# Patient Record
Sex: FEMALE | Race: WHITE | URBAN - METROPOLITAN AREA
[De-identification: names, ages, dates, MRNs, and addresses within clinical notes are randomized per-mention and may not be internally consistent; named-entity substitution may affect disease eponyms.]

---

## 2020-06-01 ENCOUNTER — TELEPHONE ENCOUNTER (OUTPATIENT)
Dept: URBAN - METROPOLITAN AREA CLINIC 80 | Facility: CLINIC | Age: 70
End: 2020-06-01

## 2020-06-03 ENCOUNTER — TELEPHONE ENCOUNTER (OUTPATIENT)
Dept: URBAN - METROPOLITAN AREA CLINIC 80 | Facility: CLINIC | Age: 70
End: 2020-06-03

## 2020-06-15 ENCOUNTER — OFFICE VISIT (OUTPATIENT)
Dept: URBAN - METROPOLITAN AREA CLINIC 80 | Facility: CLINIC | Age: 70
End: 2020-06-15

## 2020-06-19 ENCOUNTER — TELEPHONE ENCOUNTER (OUTPATIENT)
Dept: URBAN - METROPOLITAN AREA CLINIC 92 | Facility: CLINIC | Age: 70
End: 2020-06-19

## 2020-06-19 ENCOUNTER — OFFICE VISIT (OUTPATIENT)
Dept: URBAN - METROPOLITAN AREA TELEHEALTH 2 | Facility: TELEHEALTH | Age: 70
End: 2020-06-19
Payer: MEDICARE

## 2020-06-19 DIAGNOSIS — R10.31 RLQ ABDOMINAL PAIN: ICD-10-CM

## 2020-06-19 DIAGNOSIS — K59.01 CONSTIPATION: ICD-10-CM

## 2020-06-19 DIAGNOSIS — Z80.0 FAMILY HISTORY OF COLON CANCER: ICD-10-CM

## 2020-06-19 DIAGNOSIS — Z86.010 HISTORY OF COLON POLYPS: ICD-10-CM

## 2020-06-19 DIAGNOSIS — K57.90 DIVERTICULAR DISEASE: ICD-10-CM

## 2020-06-19 DIAGNOSIS — Z90.49 S/P CHOLECYSTECTOMY: ICD-10-CM

## 2020-06-19 DIAGNOSIS — K57.30 ACQUIRED DIVERTICULOSIS OF COLON: ICD-10-CM

## 2020-06-19 PROCEDURE — 3017F COLORECTAL CA SCREEN DOC REV: CPT | Performed by: INTERNAL MEDICINE

## 2020-06-19 PROCEDURE — 99214 OFFICE O/P EST MOD 30 MIN: CPT | Performed by: INTERNAL MEDICINE

## 2020-06-19 PROCEDURE — G8417 CALC BMI ABV UP PARAM F/U: HCPCS | Performed by: INTERNAL MEDICINE

## 2020-06-19 PROCEDURE — G9903 PT SCRN TBCO ID AS NON USER: HCPCS | Performed by: INTERNAL MEDICINE

## 2020-06-19 PROCEDURE — G8427 DOCREV CUR MEDS BY ELIG CLIN: HCPCS | Performed by: INTERNAL MEDICINE

## 2020-06-19 PROCEDURE — 1036F TOBACCO NON-USER: CPT | Performed by: INTERNAL MEDICINE

## 2020-06-19 RX ORDER — LUBIPROSTONE 8 UG/1
TAKE 1 CAPSULE (8 MCG) BY ORAL ROUTE 2 TIMES PER DAY WITH FOOD AND WATER FOR 90 DAYS CAPSULE, GELATIN COATED ORAL 2
Qty: 180 | Refills: 3 | Status: ACTIVE | COMMUNITY
Start: 2020-05-26 | End: 2021-05-21

## 2020-06-19 RX ORDER — GUARANA 1000 MG
TABLET ORAL
Qty: 0 | Refills: 0 | Status: ACTIVE | COMMUNITY
Start: 1900-01-01 | End: 1900-01-01

## 2020-06-19 RX ORDER — EPINEPHRINE 0.3 MG/.3ML
INJECT 0.3 MILLILITER (0.3 MG) BY INTRAMUSCULAR ROUTE ONCE AS NEEDED FOR ANAPHYLAXIS FOR 30 DAYS INJECTION INTRAMUSCULAR
Qty: 1 | Refills: 1 | Status: ACTIVE | COMMUNITY
Start: 2020-05-26 | End: 2020-07-25

## 2020-06-19 RX ORDER — DICYCLOMINE HYDROCHLORIDE 10 MG/1
TAKE 1 CAPSULE BY ORAL ROUTE 3 TIMES A DAY AS NEEDED FOR 30 DAYS CAPSULE ORAL
Qty: 60 | Refills: 2 | Status: ACTIVE | COMMUNITY
Start: 2020-05-07 | End: 2020-08-05

## 2020-06-19 RX ORDER — TIZANIDINE HYDROCHLORIDE 4 MG/1
CAPSULE ORAL
Qty: 0 | Refills: 0 | Status: ACTIVE | COMMUNITY
Start: 1900-01-01 | End: 1900-01-01

## 2020-06-19 RX ORDER — OXYCODONE HYDROCHLORIDE 10 MG/1
TABLET ORAL
Qty: 0 | Refills: 0 | Status: ACTIVE | COMMUNITY
Start: 1900-01-01 | End: 1900-01-01

## 2020-06-19 NOTE — PHYSICAL EXAM NEUROLOGIC:
oriented to person, place and time , normal sensation , short and long term memory intact , oriented to person, place and time

## 2020-06-19 NOTE — PHYSICAL EXAM HENT:
Head,  normocephalic,  atraumatic,  Face,  Face within normal limits,  Ears,  External ears within normal limits,  Nose/Nasopharynx,  External nose  normal appearance,  nares patent,  no nasal discharge,

## 2020-06-19 NOTE — HPI-OTHER HISTORIES
Patient seen today via telehealth by agreement and consent of patient in light of current COVID-19 pandemic. I used video conferencing during the visit. The patient encounter is appropriate and reasonable under the circumstances given the patient's particular presentation at this time. The patient has been advised of the followin) the potential risks and limitations of this mode of treatment (including but not limited to the absence of in-person examination); 2) the right to refuse telehealth services at any point without affecting the right to future care; 3) the right to receive in-person services, included immediately after this consultation if an urgent need arises; 4) information, including identifiable images or information from this telehealth consult, will only be shared in accordance with HIPAA regulations. Any and all of the patient's and/or patient's family member's questions on this issue have been answered. The patient has verbally consented to be treated via telehealth services. The patient has also been advised to contact this office for worsening conditions or problems, and seek emergency medical treatment and/or call 911 if the patient deems either necessary.      pt with rlq pain that started in 2019 pain is constant and radiating to her pelvis s/p hysterectomy in the past wakes her up in the morning improves with heat pads pain unrelated to eating or drinking exacerbated with standing and walking she is cautious about her diet due to concern for diverticulitis  assoc constipation with 1 bm every 1-2 weeks with type 1 stool on the bristol scale she was given amitiza and had diarrhea with bid dosing.  she is doing better with daily dosing but denies any change in her pain with improved bm feels like "charcoal on the right side when she walks' no blood in the stool in the interim, she had a mrcp with dilateed biliary duct but no stones.  cta done with right middle lobe atalectasis vs chronic mucus blugging but no signs of abdominal vascular impingement previously given gabapentin and had relief x 1 day sees pain doctor and on keppra, flexeril, and oxycodone without relief  ct in 2019 with cholecystitis. mild cbd prominence and diverticular disease cta in 2020 mrcp in 2020  prior work up with dr. ramirez with egd in 2020 with normal colon and small bowel biopsies prior colon for hx of polyps in 2019 with no polyps.  5 yr f/u given.

## 2020-06-19 NOTE — PHYSICAL EXAM CONSTITUTIONAL:
well developed, well nourished , in no acute distress , ambulating without difficulty , normal communication ability , pleasant, well nourished, well developed, in no acute distress , normal communication ability

## 2020-06-19 NOTE — PHYSICAL EXAM GASTROINTESTINAL
Abdomen , self exam __ soft, tender to palpation in the periumbilical and rlq area.  not exacerbated with touching/pressure, nondistended

## 2020-06-30 ENCOUNTER — DASHBOARD ENCOUNTERS (OUTPATIENT)
Age: 70
End: 2020-06-30

## 2020-07-06 ENCOUNTER — OFFICE VISIT (OUTPATIENT)
Dept: URBAN - METROPOLITAN AREA CLINIC 80 | Facility: CLINIC | Age: 70
End: 2020-07-06

## 2020-07-06 RX ORDER — DICYCLOMINE HYDROCHLORIDE 10 MG/1
TAKE 1 CAPSULE BY ORAL ROUTE 3 TIMES A DAY AS NEEDED FOR 30 DAYS CAPSULE ORAL
Qty: 60 | Refills: 2 | COMMUNITY
Start: 2020-05-07 | End: 2020-08-05

## 2020-07-06 RX ORDER — OXYCODONE HYDROCHLORIDE 10 MG/1
TABLET ORAL
Qty: 0 | Refills: 0 | COMMUNITY
Start: 1900-01-01

## 2020-07-06 RX ORDER — EPINEPHRINE 0.3 MG/.3ML
INJECT 0.3 MILLILITER (0.3 MG) BY INTRAMUSCULAR ROUTE ONCE AS NEEDED FOR ANAPHYLAXIS FOR 30 DAYS INJECTION INTRAMUSCULAR
Qty: 1 | Refills: 1 | COMMUNITY
Start: 2020-05-26 | End: 2020-07-25

## 2020-07-06 RX ORDER — LUBIPROSTONE 8 UG/1
TAKE 1 CAPSULE (8 MCG) BY ORAL ROUTE 2 TIMES PER DAY WITH FOOD AND WATER FOR 90 DAYS CAPSULE, GELATIN COATED ORAL 2
Qty: 180 | Refills: 3 | COMMUNITY
Start: 2020-05-26 | End: 2021-05-21

## 2020-07-06 RX ORDER — TIZANIDINE HYDROCHLORIDE 4 MG/1
CAPSULE ORAL
Qty: 0 | Refills: 0 | COMMUNITY
Start: 1900-01-01

## 2020-07-06 RX ORDER — GUARANA 1000 MG
TABLET ORAL
Qty: 0 | Refills: 0 | COMMUNITY
Start: 1900-01-01

## 2020-07-14 ENCOUNTER — ERX REFILL RESPONSE (OUTPATIENT)
Age: 70
End: 2020-07-14

## 2020-07-14 RX ORDER — DICYCLOMINE HYDROCHLORIDE 10 MG/1
TAKE ONE CAPSULE BY MOUTH THREE TIMES A DAY AS NEEDED CAPSULE ORAL
Qty: 60 | Refills: 1

## 2022-05-12 ENCOUNTER — TELEPHONE ENCOUNTER (OUTPATIENT)
Dept: URBAN - METROPOLITAN AREA CLINIC 80 | Facility: CLINIC | Age: 72
End: 2022-05-12

## 2022-05-12 RX ORDER — LACTULOSE 10 G/15ML
15 ML SOLUTION ORAL ONCE A DAY
Qty: 450 | OUTPATIENT
Start: 2022-05-12 | End: 2022-06-11

## 2023-09-12 ENCOUNTER — OFFICE VISIT (OUTPATIENT)
Dept: URBAN - METROPOLITAN AREA CLINIC 80 | Facility: CLINIC | Age: 73
End: 2023-09-12